# Patient Record
Sex: MALE | Race: WHITE | NOT HISPANIC OR LATINO | URBAN - METROPOLITAN AREA
[De-identification: names, ages, dates, MRNs, and addresses within clinical notes are randomized per-mention and may not be internally consistent; named-entity substitution may affect disease eponyms.]

---

## 2018-01-11 ENCOUNTER — LAB (OUTPATIENT)
Dept: LAB | Facility: HOSPITAL | Age: 28
End: 2018-01-11
Attending: INTERNAL MEDICINE

## 2018-01-11 DIAGNOSIS — R55 SYNCOPE AND COLLAPSE: Primary | ICD-10-CM

## 2018-01-11 DIAGNOSIS — R53.1 ASTHENIA: ICD-10-CM

## 2018-01-11 DIAGNOSIS — R42 DIZZINESS AND GIDDINESS: ICD-10-CM

## 2018-01-11 LAB
ANION GAP SERPL CALCULATED.3IONS-SCNC: 9 MMOL/L (ref 3–11)
BUN BLD-MCNC: 13 MG/DL (ref 9–23)
BUN/CREAT SERPL: 13 (ref 7–25)
CALCIUM SPEC-SCNC: 9.8 MG/DL (ref 8.7–10.4)
CHLORIDE SERPL-SCNC: 104 MMOL/L (ref 99–109)
CO2 SERPL-SCNC: 27 MMOL/L (ref 20–31)
CREAT BLD-MCNC: 1 MG/DL (ref 0.6–1.3)
DEPRECATED FTI SERPL-MCNC: 3.1 TBI
DEPRECATED RDW RBC AUTO: 42.4 FL (ref 37–54)
ERYTHROCYTE [DISTWIDTH] IN BLOOD BY AUTOMATED COUNT: 12.7 % (ref 11.3–14.5)
GFR SERPL CREATININE-BSD FRML MDRD: 90 ML/MIN/1.73
GLUCOSE BLD-MCNC: 79 MG/DL (ref 70–100)
HCT VFR BLD AUTO: 45.7 % (ref 38.9–50.9)
HGB BLD-MCNC: 15.8 G/DL (ref 13.1–17.5)
MCH RBC QN AUTO: 31.9 PG (ref 27–31)
MCHC RBC AUTO-ENTMCNC: 34.6 G/DL (ref 32–36)
MCV RBC AUTO: 92.3 FL (ref 80–99)
PLATELET # BLD AUTO: 218 10*3/MM3 (ref 150–450)
PMV BLD AUTO: 12.1 FL (ref 6–12)
POTASSIUM BLD-SCNC: 4.2 MMOL/L (ref 3.5–5.5)
RBC # BLD AUTO: 4.95 10*6/MM3 (ref 4.2–5.76)
SODIUM BLD-SCNC: 140 MMOL/L (ref 132–146)
T3RU NFR SERPL: 40.7 % (ref 23–37)
T4 SERPL-MCNC: 7.6 MCG/DL (ref 4.7–11.4)
T4 SERPL-MCNC: 7.6 MCG/DL (ref 4.7–11.4)
TSH SERPL DL<=0.05 MIU/L-ACNC: 0.77 MIU/ML (ref 0.35–5.35)
WBC NRBC COR # BLD: 7.56 10*3/MM3 (ref 3.5–10.8)

## 2018-01-11 PROCEDURE — 36415 COLL VENOUS BLD VENIPUNCTURE: CPT

## 2018-01-11 PROCEDURE — 85027 COMPLETE CBC AUTOMATED: CPT

## 2018-01-11 PROCEDURE — 84436 ASSAY OF TOTAL THYROXINE: CPT

## 2018-01-11 PROCEDURE — 84479 ASSAY OF THYROID (T3 OR T4): CPT

## 2018-01-11 PROCEDURE — 80048 BASIC METABOLIC PNL TOTAL CA: CPT

## 2018-01-11 PROCEDURE — 84443 ASSAY THYROID STIM HORMONE: CPT

## 2023-09-22 ENCOUNTER — LAB (OUTPATIENT)
Dept: INTERNAL MEDICINE | Facility: CLINIC | Age: 33
End: 2023-09-22
Payer: COMMERCIAL

## 2023-09-22 ENCOUNTER — OFFICE VISIT (OUTPATIENT)
Dept: INTERNAL MEDICINE | Facility: CLINIC | Age: 33
End: 2023-09-22

## 2023-09-22 VITALS
WEIGHT: 195.2 LBS | HEIGHT: 71 IN | OXYGEN SATURATION: 99 % | TEMPERATURE: 98 F | BODY MASS INDEX: 27.33 KG/M2 | RESPIRATION RATE: 18 BRPM | DIASTOLIC BLOOD PRESSURE: 86 MMHG | SYSTOLIC BLOOD PRESSURE: 130 MMHG | HEART RATE: 78 BPM

## 2023-09-22 DIAGNOSIS — F10.10 ALCOHOL ABUSE: ICD-10-CM

## 2023-09-22 DIAGNOSIS — F32.A ANXIETY AND DEPRESSION: ICD-10-CM

## 2023-09-22 DIAGNOSIS — F32.A ANXIETY AND DEPRESSION: Primary | ICD-10-CM

## 2023-09-22 DIAGNOSIS — F41.9 ANXIETY AND DEPRESSION: Primary | ICD-10-CM

## 2023-09-22 DIAGNOSIS — F41.9 ANXIETY AND DEPRESSION: ICD-10-CM

## 2023-09-22 LAB
25(OH)D3 SERPL-MCNC: 49.6 NG/ML (ref 30–100)
ALBUMIN SERPL-MCNC: 4.9 G/DL (ref 3.5–5.2)
ALBUMIN/GLOB SERPL: 2 G/DL
ALP SERPL-CCNC: 82 U/L (ref 39–117)
ALT SERPL W P-5'-P-CCNC: 14 U/L (ref 1–41)
ANION GAP SERPL CALCULATED.3IONS-SCNC: 13.3 MMOL/L (ref 5–15)
AST SERPL-CCNC: 20 U/L (ref 1–40)
BILIRUB SERPL-MCNC: 0.9 MG/DL (ref 0–1.2)
BUN SERPL-MCNC: 11 MG/DL (ref 6–20)
BUN/CREAT SERPL: 9.6 (ref 7–25)
CALCIUM SPEC-SCNC: 10 MG/DL (ref 8.6–10.5)
CHLORIDE SERPL-SCNC: 104 MMOL/L (ref 98–107)
CHOLEST SERPL-MCNC: 218 MG/DL (ref 0–200)
CO2 SERPL-SCNC: 24.7 MMOL/L (ref 22–29)
CREAT SERPL-MCNC: 1.14 MG/DL (ref 0.76–1.27)
DEPRECATED RDW RBC AUTO: 43.7 FL (ref 37–54)
EGFRCR SERPLBLD CKD-EPI 2021: 87.1 ML/MIN/1.73
ERYTHROCYTE [DISTWIDTH] IN BLOOD BY AUTOMATED COUNT: 12.8 % (ref 12.3–15.4)
FOLATE SERPL-MCNC: 10.3 NG/ML (ref 4.78–24.2)
GLOBULIN UR ELPH-MCNC: 2.4 GM/DL
GLUCOSE SERPL-MCNC: 86 MG/DL (ref 65–99)
HCT VFR BLD AUTO: 47.2 % (ref 37.5–51)
HDLC SERPL-MCNC: 52 MG/DL (ref 40–60)
HGB BLD-MCNC: 16.6 G/DL (ref 13–17.7)
LDLC SERPL CALC-MCNC: 148 MG/DL (ref 0–100)
LDLC/HDLC SERPL: 2.81 {RATIO}
MCH RBC QN AUTO: 33.1 PG (ref 26.6–33)
MCHC RBC AUTO-ENTMCNC: 35.2 G/DL (ref 31.5–35.7)
MCV RBC AUTO: 94 FL (ref 79–97)
PLATELET # BLD AUTO: 235 10*3/MM3 (ref 140–450)
PMV BLD AUTO: 11.5 FL (ref 6–12)
POTASSIUM SERPL-SCNC: 3.8 MMOL/L (ref 3.5–5.2)
PROT SERPL-MCNC: 7.3 G/DL (ref 6–8.5)
RBC # BLD AUTO: 5.02 10*6/MM3 (ref 4.14–5.8)
SODIUM SERPL-SCNC: 142 MMOL/L (ref 136–145)
TRIGL SERPL-MCNC: 99 MG/DL (ref 0–150)
TSH SERPL DL<=0.05 MIU/L-ACNC: 0.99 UIU/ML (ref 0.27–4.2)
VIT B12 BLD-MCNC: 515 PG/ML (ref 211–946)
VLDLC SERPL-MCNC: 18 MG/DL (ref 5–40)
WBC NRBC COR # BLD: 9.85 10*3/MM3 (ref 3.4–10.8)

## 2023-09-22 PROCEDURE — 82746 ASSAY OF FOLIC ACID SERUM: CPT | Performed by: NURSE PRACTITIONER

## 2023-09-22 PROCEDURE — 80050 GENERAL HEALTH PANEL: CPT | Performed by: NURSE PRACTITIONER

## 2023-09-22 PROCEDURE — 80061 LIPID PANEL: CPT | Performed by: NURSE PRACTITIONER

## 2023-09-22 PROCEDURE — 83036 HEMOGLOBIN GLYCOSYLATED A1C: CPT | Performed by: NURSE PRACTITIONER

## 2023-09-22 PROCEDURE — 82306 VITAMIN D 25 HYDROXY: CPT | Performed by: NURSE PRACTITIONER

## 2023-09-22 PROCEDURE — 84425 ASSAY OF VITAMIN B-1: CPT | Performed by: NURSE PRACTITIONER

## 2023-09-22 PROCEDURE — 82607 VITAMIN B-12: CPT | Performed by: NURSE PRACTITIONER

## 2023-09-22 PROCEDURE — 36415 COLL VENOUS BLD VENIPUNCTURE: CPT | Performed by: NURSE PRACTITIONER

## 2023-09-22 RX ORDER — ESCITALOPRAM OXALATE 10 MG/1
10 TABLET ORAL DAILY
Qty: 30 TABLET | Refills: 1 | Status: SHIPPED | OUTPATIENT
Start: 2023-09-22

## 2023-09-22 RX ORDER — PREDNISONE 20 MG/1
20 TABLET ORAL DAILY
COMMUNITY
Start: 2023-09-20

## 2023-09-22 NOTE — PROGRESS NOTES
Subjective   Riaz Dumont is a 33 y.o. male here to establish care.  Chief Complaint   Patient presents with    Establish Care    Anxiety     Pt has been having a hard time lately with his anxiety, has taken paxil in the past but did not feel it worked well after some time     Depression    Alcohol Problem     Pt would like assistance with cutting back on his drinking       History of Present Illness     He is here to discuss anxiety, depression, and alcohol use.    The patient states he had an episode of anxiety and depression. He is not familiar with Lexapro. He notes he is familiar with Wellbutrin and Zoloft; however, he has not been of the medication before. He feels his depression has caused intercourse issues. He denies any other concerns.     He reports he has been over drinking. He adds drinking helps him relax. 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 He states he is not on medication; however, he was on Paxil approximately 3 years ago, but he quit because he felt like he did not need to take it anymore. He adds he was on Paxil for approximately 1.5 years which was somewhat helpful. He notes he might have been on 15 mg of Paxil, but only took 0.5 of the dose. He denies having any suicidal thoughts. The patient states he drinks approximately 4 to 5 beers a day. He denies having withdraw symptoms when he does not drink. He occasionally feel like he needs a drink first thing in the morning, especially when he gets off a shift to fall asleep quicker. The patient states he gets annoyed when someone talks about how much he drinks. He feels guilty about how much he drinks. He fells he should cut down on drinking and would like to go back to drinking occasionally instead of just wanted to drink.       CAGE Questionnaire:  Have you ever felt you should Cut down on your drinking? y  Have people Annoyed you by criticizing your drinking? y  Have you ever felt bad or Guilty about your drinking? y  Have  you ever had a drink first thing in the morning to steady your nerves or to get rid of a hangover (Eye opener)? y  SCORE 4/4    The following portions of the patient's history were reviewed and updated as appropriate: allergies, current medications, past family history, past medical history, past social history, past surgical history and problem list.    Review of Systems   Constitutional:  Negative for fatigue, fever and unexpected weight loss.   Eyes:  Negative for blurred vision, double vision and visual disturbance.   Respiratory:  Negative for cough, shortness of breath and wheezing.    Cardiovascular:  Negative for chest pain, palpitations and leg swelling.   Gastrointestinal:  Negative for abdominal pain, constipation, diarrhea, nausea and vomiting.   Genitourinary:  Negative for difficulty urinating, frequency and urgency.   Musculoskeletal:  Negative for arthralgias and myalgias.   Skin:  Negative for color change and rash.   Neurological:  Negative for dizziness, weakness and headache.   Hematological:  Negative for adenopathy. Does not bruise/bleed easily.     PHQ-9 Depression Screening  Little interest or pleasure in doing things? 2-->more than half the days   Feeling down, depressed, or hopeless? 3-->nearly every day   Trouble falling or staying asleep, or sleeping too much? 3-->nearly every day   Feeling tired or having little energy? 3-->nearly every day   Poor appetite or overeating? 2-->more than half the days   Feeling bad about yourself - or that you are a failure or have let yourself or your family down? 3-->nearly every day   Trouble concentrating on things, such as reading the newspaper or watching television? 3-->nearly every day   Moving or speaking so slowly that other people could have noticed? Or the opposite - being so fidgety or restless that you have been moving around a lot more than usual? 2-->more than half the days   Thoughts that you would be better off dead, or of hurting yourself  in some way? 0-->not at all   PHQ-9 Total Score 21   If you checked off any problems, how difficult have these problems made it for you to do your work, take care of things at home, or get along with other people? not difficult at all      Anxiety MADAY-7    Feeling nervous, anxious or on edge: Nearly every day  Not being able to stop or control worrying: Nearly every day  Worrying too much about different things: Nearly every day  Trouble Relaxing: Nearly every day  Being so restless that it is hard to sit still: More than half the days  Becoming easily annoyed or irritable: Nearly every day  Feeling afraid as if something awful might happen: Nearly every day  MADAY 7 Total Score: 20  If you checked any problems, how difficult have these problems made it for you to do your work, take care of things at home, or get along with other people: Somewhat difficult          No Known Allergies  Past Medical History:   Diagnosis Date    Anxiety 2021    Had med for anxiety/depression before. Need try soemthing different    Depression 2021    Same as anxiety     Past Surgical History:   Procedure Laterality Date    EYE SURGERY  2018    Had lasik surgery    VASECTOMY  2018     Family History   Problem Relation Age of Onset    Thyroid disease Mother     Cancer Father         Had skin cancer    Diabetes Father      Social History     Socioeconomic History    Marital status:    Tobacco Use    Smoking status: Some Days     Types: Electronic Cigarette    Smokeless tobacco: Current   Vaping Use    Vaping Use: Every day    Substances: Nicotine, Flavoring    Devices: Disposable   Substance and Sexual Activity    Alcohol use: Yes     Alcohol/week: 4.0 standard drinks     Types: 4 Cans of beer per week     Comment: 4-5 days week    Drug use: Never    Sexual activity: Yes     Partners: Female     Birth control/protection: Vasectomy     Immunization History   Administered Date(s) Administered    COVID-19 (MODERNA) Monovalent Original  "Booster 01/12/2022       Current Outpatient Medications:     predniSONE (DELTASONE) 20 MG tablet, Take 1 tablet by mouth Daily., Disp: , Rfl:     escitalopram (Lexapro) 10 MG tablet, Take 1 tablet by mouth Daily., Disp: 30 tablet, Rfl: 1    Objective   Blood pressure 130/86, pulse 78, temperature 98 °F (36.7 °C), temperature source Infrared, resp. rate 18, height 180.3 cm (71\"), weight 88.5 kg (195 lb 3.2 oz), SpO2 99 %.  Physical Exam  Vitals and nursing note reviewed.   Constitutional:       Appearance: Normal appearance. He is well-developed.   HENT:      Head: Normocephalic and atraumatic.   Eyes:      Conjunctiva/sclera: Conjunctivae normal.   Cardiovascular:      Rate and Rhythm: Normal rate and regular rhythm.      Heart sounds: Normal heart sounds.   Pulmonary:      Effort: Pulmonary effort is normal. No respiratory distress.      Breath sounds: Normal breath sounds.   Abdominal:      General: Bowel sounds are normal. There is no distension.      Palpations: Abdomen is soft.      Tenderness: There is no abdominal tenderness.   Musculoskeletal:      Cervical back: Normal range of motion.   Skin:     General: Skin is warm and dry.   Neurological:      Mental Status: He is alert and oriented to person, place, and time.   Psychiatric:         Speech: Speech normal.         Behavior: Behavior normal.         Thought Content: Thought content normal.         Judgment: Judgment normal.       Assessment & Plan   Diagnoses and all orders for this visit:    1. Anxiety and depression (Primary)  -     escitalopram (Lexapro) 10 MG tablet; Take 1 tablet by mouth Daily.  Dispense: 30 tablet; Refill: 1  -     CBC (No Diff); Future  -     Comprehensive Metabolic Panel; Future  -     Lipid Panel; Future  -     Hemoglobin A1c; Future  -     Vitamin B12; Future  -     Vitamin D,25-Hydroxy; Future  -     TSH Rfx On Abnormal To Free T4; Future    2. Alcohol abuse  -     Ambulatory Referral to Chemical Dependency  -     CBC (No " Diff); Future  -     Comprehensive Metabolic Panel; Future  -     Lipid Panel; Future  -     Hemoglobin A1c; Future  -     Vitamin B12; Future  -     Vitamin D,25-Hydroxy; Future  -     TSH Rfx On Abnormal To Free T4; Future  -     Vitamin B1, Whole Blood; Future  -     Vitamin B12; Future  -     Folate; Future    Anxiety and depression  - Will start on Lexapro 10 mg and check labs. Adverse effects and expectations discussed. Follow up in 4 weeks for reevaluation.    Alcohol abuse  - Refer to chemical dependency program. Encouraged cessation/continued abstinence.         Return in about 4 weeks (around 10/20/2023) for Recheck, and need to collect labs today.  Plan of care discussed with pt. They verbalized understanding and agreement.     Transcribed from ambient dictation for ALEXANDER Amos by Shun Arevalo.  09/22/23   13:14 EDT    Patient or patient representative verbalized consent to the visit recording.  I have personally performed the services described in this document as transcribed by the above individual, and it is both accurate and complete.  ALEXANDER Amos  9/25/2023  12:41 EDT

## 2023-09-23 LAB — HBA1C MFR BLD: 4.7 % (ref 4.8–5.6)

## 2023-09-29 LAB — VIT B1 BLD-SCNC: 154.3 NMOL/L (ref 66.5–200)

## 2023-10-25 ENCOUNTER — OFFICE VISIT (OUTPATIENT)
Dept: INTERNAL MEDICINE | Facility: CLINIC | Age: 33
End: 2023-10-25
Payer: COMMERCIAL

## 2023-10-25 VITALS
SYSTOLIC BLOOD PRESSURE: 108 MMHG | HEIGHT: 71 IN | TEMPERATURE: 97.8 F | HEART RATE: 72 BPM | WEIGHT: 198.6 LBS | BODY MASS INDEX: 27.8 KG/M2 | RESPIRATION RATE: 18 BRPM | OXYGEN SATURATION: 98 % | DIASTOLIC BLOOD PRESSURE: 76 MMHG

## 2023-10-25 DIAGNOSIS — F41.9 ANXIETY AND DEPRESSION: ICD-10-CM

## 2023-10-25 DIAGNOSIS — F32.A ANXIETY AND DEPRESSION: ICD-10-CM

## 2023-10-25 PROCEDURE — 99213 OFFICE O/P EST LOW 20 MIN: CPT | Performed by: NURSE PRACTITIONER

## 2023-10-25 RX ORDER — ESCITALOPRAM OXALATE 20 MG/1
20 TABLET ORAL DAILY
Qty: 30 TABLET | Refills: 3 | Status: SHIPPED | OUTPATIENT
Start: 2023-10-25

## 2023-10-25 RX ORDER — ESCITALOPRAM OXALATE 20 MG/1
20 TABLET ORAL DAILY
Start: 2023-10-25 | End: 2023-10-25 | Stop reason: SDUPTHER

## 2023-10-25 NOTE — PATIENT INSTRUCTIONS
Calorie Counting for Weight Loss  Calories are units of energy. Your body needs a certain number of calories from food to keep going throughout the day. When you eat or drink more calories than your body needs, your body stores the extra calories mostly as fat. When you eat or drink fewer calories than your body needs, your body burns fat to get the energy it needs.  Calorie counting means keeping track of how many calories you eat and drink each day. Calorie counting can be helpful if you need to lose weight. If you eat fewer calories than your body needs, you should lose weight. Ask your health care provider what a healthy weight is for you.  For calorie counting to work, you will need to eat the right number of calories each day to lose a healthy amount of weight per week. A dietitian can help you figure out how many calories you need in a day and will suggest ways to reach your calorie goal.  A healthy amount of weight to lose each week is usually 1-2 lb (0.5-0.9 kg). This usually means that your daily calorie intake should be reduced by 500-750 calories.  Eating 1,200-1,500 calories a day can help most women lose weight.  Eating 1,500-1,800 calories a day can help most men lose weight.  What do I need to know about calorie counting?  Work with your health care provider or dietitian to determine how many calories you should get each day. To meet your daily calorie goal, you will need to:  Find out how many calories are in each food that you would like to eat. Try to do this before you eat.  Decide how much of the food you plan to eat.  Keep a food log. Do this by writing down what you ate and how many calories it had.  To successfully lose weight, it is important to balance calorie counting with a healthy lifestyle that includes regular activity.  Where do I find calorie information?    The number of calories in a food can be found on a Nutrition Facts label. If a food does not have a Nutrition Facts label, try  to look up the calories online or ask your dietitian for help.  Remember that calories are listed per serving. If you choose to have more than one serving of a food, you will have to multiply the calories per serving by the number of servings you plan to eat. For example, the label on a package of bread might say that a serving size is 1 slice and that there are 90 calories in a serving. If you eat 1 slice, you will have eaten 90 calories. If you eat 2 slices, you will have eaten 180 calories.  How do I keep a food log?  After each time that you eat, record the following in your food log as soon as possible:  What you ate. Be sure to include toppings, sauces, and other extras on the food.  How much you ate. This can be measured in cups, ounces, or number of items.  How many calories were in each food and drink.  The total number of calories in the food you ate.  Keep your food log near you, such as in a pocket-sized notebook or on an jaja or website on your mobile phone. Some programs will calculate calories for you and show you how many calories you have left to meet your daily goal.  What are some portion-control tips?  Know how many calories are in a serving. This will help you know how many servings you can have of a certain food.  Use a measuring cup to measure serving sizes. You could also try weighing out portions on a kitchen scale. With time, you will be able to estimate serving sizes for some foods.  Take time to put servings of different foods on your favorite plates or in your favorite bowls and cups so you know what a serving looks like.  Try not to eat straight from a food's packaging, such as from a bag or box. Eating straight from the package makes it hard to see how much you are eating and can lead to overeating. Put the amount you would like to eat in a cup or on a plate to make sure you are eating the right portion.  Use smaller plates, glasses, and bowls for smaller portions and to prevent  overeating.  Try not to multitask. For example, avoid watching TV or using your computer while eating. If it is time to eat, sit down at a table and enjoy your food. This will help you recognize when you are full. It will also help you be more mindful of what and how much you are eating.  What are tips for following this plan?  Reading food labels  Check the calorie count compared with the serving size. The serving size may be smaller than what you are used to eating.  Check the source of the calories. Try to choose foods that are high in protein, fiber, and vitamins, and low in saturated fat, trans fat, and sodium.  Shopping  Read nutrition labels while you shop. This will help you make healthy decisions about which foods to buy.  Pay attention to nutrition labels for low-fat or fat-free foods. These foods sometimes have the same number of calories or more calories than the full-fat versions. They also often have added sugar, starch, or salt to make up for flavor that was removed with the fat.  Make a grocery list of lower-calorie foods and stick to it.  Cooking  Try to cook your favorite foods in a healthier way. For example, try baking instead of frying.  Use low-fat dairy products.  Meal planning  Use more fruits and vegetables. One-half of your plate should be fruits and vegetables.  Include lean proteins, such as chicken, turkey, and fish.  Lifestyle  Each week, aim to do one of the followin minutes of moderate exercise, such as walking.  75 minutes of vigorous exercise, such as running.  General information  Know how many calories are in the foods you eat most often. This will help you calculate calorie counts faster.  Find a way of tracking calories that works for you. Get creative. Try different apps or programs if writing down calories does not work for you.  What foods should I eat?    Eat nutritious foods. It is better to have a nutritious, high-calorie food, such as an avocado, than a food with  few nutrients, such as a bag of potato chips.  Use your calories on foods and drinks that will fill you up and will not leave you hungry soon after eating.  Examples of foods that fill you up are nuts and nut butters, vegetables, lean proteins, and high-fiber foods such as whole grains. High-fiber foods are foods with more than 5 g of fiber per serving.  Pay attention to calories in drinks. Low-calorie drinks include water and unsweetened drinks.  The items listed above may not be a complete list of foods and beverages you can eat. Contact a dietitian for more information.  What foods should I limit?  Limit foods or drinks that are not good sources of vitamins, minerals, or protein or that are high in unhealthy fats. These include:  Candy.  Other sweets.  Sodas, specialty coffee drinks, alcohol, and juice.  The items listed above may not be a complete list of foods and beverages you should avoid. Contact a dietitian for more information.  How do I count calories when eating out?  Pay attention to portions. Often, portions are much larger when eating out. Try these tips to keep portions smaller:  Consider sharing a meal instead of getting your own.  If you get your own meal, eat only half of it. Before you start eating, ask for a container and put half of your meal into it.  When available, consider ordering smaller portions from the menu instead of full portions.  Pay attention to your food and drink choices. Knowing the way food is cooked and what is included with the meal can help you eat fewer calories.  If calories are listed on the menu, choose the lower-calorie options.  Choose dishes that include vegetables, fruits, whole grains, low-fat dairy products, and lean proteins.  Choose items that are boiled, broiled, grilled, or steamed. Avoid items that are buttered, battered, fried, or served with cream sauce. Items labeled as crispy are usually fried, unless stated otherwise.  Choose water, low-fat milk,  unsweetened iced tea, or other drinks without added sugar. If you want an alcoholic beverage, choose a lower-calorie option, such as a glass of wine or light beer.  Ask for dressings, sauces, and syrups on the side. These are usually high in calories, so you should limit the amount you eat.  If you want a salad, choose a garden salad and ask for grilled meats. Avoid extra toppings such as espinal, cheese, or fried items. Ask for the dressing on the side, or ask for olive oil and vinegar or lemon to use as dressing.  Estimate how many servings of a food you are given. Knowing serving sizes will help you be aware of how much food you are eating at restaurants.  Where to find more information  Centers for Disease Control and Prevention: www.cdc.gov  [place] Department of Agriculture: myplate.gov  Summary  Calorie counting means keeping track of how many calories you eat and drink each day. If you eat fewer calories than your body needs, you should lose weight.  A healthy amount of weight to lose per week is usually 1-2 lb (0.5-0.9 kg). This usually means reducing your daily calorie intake by 500-750 calories.  The number of calories in a food can be found on a Nutrition Facts label. If a food does not have a Nutrition Facts label, try to look up the calories online or ask your dietitian for help.  Use smaller plates, glasses, and bowls for smaller portions and to prevent overeating.  Use your calories on foods and drinks that will fill you up and not leave you hungry shortly after a meal.  This information is not intended to replace advice given to you by your health care provider. Make sure you discuss any questions you have with your health care provider.  Document Revised: 01/28/2021 Document Reviewed: 01/28/2021  Elsevier Patient Education © 2023 [employer]  Exercising to Lose Weight  Getting regular exercise is important for everyone. It is especially important if you are overweight. Being overweight increases your  risk of heart disease, stroke, diabetes, high blood pressure, and several types of cancer. Exercising, and reducing the calories you consume, can help you lose weight and improve fitness and health.  Exercise can be moderate or vigorous intensity. To lose weight, most people need to do a certain amount of moderate or vigorous-intensity exercise each week.  How can exercise affect me?  You lose weight when you exercise enough to burn more calories than you eat. Exercise also reduces body fat and builds muscle. The more muscle you have, the more calories you burn. Exercise also:  Improves mood.  Reduces stress and tension.  Improves your overall fitness, flexibility, and endurance.  Increases bone strength.  Moderate-intensity exercise    Moderate-intensity exercise is any activity that gets you moving enough to burn at least three times more energy (calories) than if you were sitting.  Examples of moderate exercise include:  Walking a mile in 15 minutes.  Doing light yard work.  Biking at an easy pace.  Most people should get at least 150 minutes of moderate-intensity exercise a week to maintain their body weight.  Vigorous-intensity exercise  Vigorous-intensity exercise is any activity that gets you moving enough to burn at least six times more calories than if you were sitting. When you exercise at this intensity, you should be working hard enough that you are not able to carry on a conversation.  Examples of vigorous exercise include:  Running.  Playing a team sport, such as football, basketball, and soccer.  Jumping rope.  Most people should get at least 75 minutes a week of vigorous exercise to maintain their body weight.  What actions can I take to lose weight?  The amount of exercise you need to lose weight depends on:  Your age.  The type of exercise.  Any health conditions you have.  Your overall physical ability.  Talk to your health care provider about how much exercise you need and what types of  activities are safe for you.  Nutrition    Make changes to your diet as told by your health care provider or diet and nutrition specialist (dietitian). This may include:  Eating fewer calories.  Eating more protein.  Eating less unhealthy fats.  Eating a diet that includes fresh fruits and vegetables, whole grains, low-fat dairy products, and lean protein.  Avoiding foods with added fat, salt, and sugar.  Drink plenty of water while you exercise to prevent dehydration or heat stroke.  Activity  Choose an activity that you enjoy and set realistic goals. Your health care provider can help you make an exercise plan that works for you.  Exercise at a moderate or vigorous intensity most days of the week.  The intensity of exercise may vary from person to person. You can tell how intense a workout is for you by paying attention to your breathing and heartbeat. Most people will notice their breathing and heartbeat get faster with more intense exercise.  Do resistance training twice each week, such as:  Push-ups.  Sit-ups.  Lifting weights.  Using resistance bands.  Getting short amounts of exercise can be just as helpful as long, structured periods of exercise. If you have trouble finding time to exercise, try doing these things as part of your daily routine:  Get up, stretch, and walk around every 30 minutes throughout the day.  Go for a walk during your lunch break.  Park your car farther away from your destination.  If you take public transportation, get off one stop early and walk the rest of the way.  Make phone calls while standing up and walking around.  Take the stairs instead of elevators or escalators.  Wear comfortable clothes and shoes with good support.  Do not exercise so much that you hurt yourself, feel dizzy, or get very short of breath.  Where to find more information  [place] Department of Health and Human Services: www.hhs.gov  Centers for Disease Control and Prevention: www.cdc.gov  Contact a health care  provider:  Before starting a new exercise program.  If you have questions or concerns about your weight.  If you have a medical problem that keeps you from exercising.  Get help right away if:  You have any of the following while exercising:  Injury.  Dizziness.  Difficulty breathing or shortness of breath that does not go away when you stop exercising.  Chest pain.  Rapid heartbeat.  These symptoms may represent a serious problem that is an emergency. Do not wait to see if the symptoms will go away. Get medical help right away. Call your local emergency services (911 in the [place] Do not drive yourself to the hospital.  Summary  Getting regular exercise is especially important if you are overweight.  Being overweight increases your risk of heart disease, stroke, diabetes, high blood pressure, and several types of cancer.  Losing weight happens when you burn more calories than you eat.  Reducing the amount of calories you eat, and getting regular moderate or vigorous exercise each week, helps you lose weight.  This information is not intended to replace advice given to you by your health care provider. Make sure you discuss any questions you have with your health care provider.  Document Revised: 02/13/2022 Document Reviewed: 02/13/2022  Elsevier Patient Education © 2023 [employer]

## 2023-10-25 NOTE — PROGRESS NOTES
Subjective   Riaz Dumont is a 33 y.o. male.     Chief Complaint   Patient presents with    Anxiety     4 week recheck     Depression     4 week recheck        PCP: Lashon Sy APRN    History of Present Illness     The patient presents to the office today for follow-up on anxiety and depression. At his last visit, he was started on Lexapro.     The patient reports he was ill for the first week after starting the Lexapro with increased fatigue and headaches. He notes he did not take the medication last night, 10/24/2023, as he had a migraine. He states he switched to taking the medication at night as he was falling asleep at work.     Currently, the patient reports he is continuing to experience depression more than anxiety. He denies any suicidal ideation. He adds that he is sleeping okay for the most part. The patient is interested in increasing his dosage, but he is concerned as he is sensitive to the medication.    The patient is not interested in updating any vaccines today. He states he undergoes annual examinations at work, and this was performed recently with normal results.     He denies any new medical concerns today.    The following portions of the patient's history were reviewed and updated as appropriate: allergies, current medications, past family history, past medical history, past social history, past surgical history and problem list.        Review of Systems   Constitutional:  Negative for fatigue, fever and unexpected weight loss.   Eyes:  Negative for blurred vision, double vision and visual disturbance.   Respiratory:  Negative for cough, shortness of breath and wheezing.    Cardiovascular:  Negative for chest pain, palpitations and leg swelling.   Gastrointestinal:  Negative for abdominal pain, constipation, diarrhea, nausea and vomiting.   Genitourinary:  Negative for difficulty urinating, frequency and urgency.   Musculoskeletal:  Negative for arthralgias and myalgias.   Skin:   Negative for color change and rash.   Neurological:  Negative for dizziness, weakness and headache.   Hematological:  Negative for adenopathy. Does not bruise/bleed easily.   Psychiatric/Behavioral:  Positive for depressed mood. The patient is nervous/anxious.            Outpatient Medications Marked as Taking for the 10/25/23 encounter (Office Visit) with Lashon Sy APRN   Medication Sig Dispense Refill    escitalopram (Lexapro) 20 MG tablet Take 1 tablet by mouth Daily. 30 tablet 3    [DISCONTINUED] escitalopram (Lexapro) 20 MG tablet Take 1 tablet by mouth Daily.       No Known Allergies        Objective   Physical Exam  Vitals and nursing note reviewed.   Constitutional:       Appearance: Normal appearance. He is well-developed.   HENT:      Head: Normocephalic and atraumatic.   Eyes:      Conjunctiva/sclera: Conjunctivae normal.   Cardiovascular:      Rate and Rhythm: Normal rate and regular rhythm.      Heart sounds: Normal heart sounds.   Pulmonary:      Effort: Pulmonary effort is normal. No respiratory distress.      Breath sounds: Normal breath sounds.   Abdominal:      General: Bowel sounds are normal. There is no distension.      Palpations: Abdomen is soft.      Tenderness: There is no abdominal tenderness.   Musculoskeletal:      Cervical back: Normal range of motion.   Skin:     General: Skin is warm and dry.   Neurological:      Mental Status: He is alert and oriented to person, place, and time.   Psychiatric:         Speech: Speech normal.         Behavior: Behavior normal.         Thought Content: Thought content normal.         Judgment: Judgment normal.       Anxiety MADAY-7    Feeling nervous, anxious or on edge: Not at all  Not being able to stop or control worrying: More than half the days  Worrying too much about different things: More than half the days  Trouble Relaxing: Several days  Being so restless that it is hard to sit still: Several days  Becoming easily annoyed or irritable:  "Not at all  Feeling afraid as if something awful might happen: Several days  MADAY 7 Total Score: 7  If you checked any problems, how difficult have these problems made it for you to do your work, take care of things at home, or get along with other people: Somewhat difficult     PHQ-9 Depression Screening  Little interest or pleasure in doing things? 0-->not at all   Feeling down, depressed, or hopeless? 1-->several days   Trouble falling or staying asleep, or sleeping too much?     Feeling tired or having little energy?     Poor appetite or overeating?     Feeling bad about yourself - or that you are a failure or have let yourself or your family down?     Trouble concentrating on things, such as reading the newspaper or watching television?     Moving or speaking so slowly that other people could have noticed? Or the opposite - being so fidgety or restless that you have been moving around a lot more than usual?     Thoughts that you would be better off dead, or of hurting yourself in some way?     PHQ-9 Total Score 1   If you checked off any problems, how difficult have these problems made it for you to do your work, take care of things at home, or get along with other people?         Vitals:    10/25/23 0810   BP: 108/76   BP Location: Right arm   Patient Position: Sitting   Cuff Size: Adult   Pulse: 72   Resp: 18   Temp: 97.8 °F (36.6 °C)   TempSrc: Infrared   SpO2: 98%   Weight: 90.1 kg (198 lb 9.6 oz)   Height: 180.3 cm (71\")     Body mass index is 27.7 kg/m².  Wt Readings from Last 3 Encounters:   10/25/23 90.1 kg (198 lb 9.6 oz)   09/22/23 88.5 kg (195 lb 3.2 oz)             Assessment & Plan   Diagnoses and all orders for this visit:    1. Anxiety and depression  -     Discontinue: escitalopram (Lexapro) 20 MG tablet; Take 1 tablet by mouth Daily.  -     escitalopram (Lexapro) 20 MG tablet; Take 1 tablet by mouth Daily.  Dispense: 30 tablet; Refill: 3      1. Anxiety and depression  - Still slightly under " controlled, but improving with Lexapro. Will increase the dose to 20 mg. He may do this by increasing to 15 mg for 1 or 2 weeks then increasing to 20 mg. He has 10 mg tablets that he can make this dose with. He will inform me if he has any issues. He will follow up in 3 months for annual examination and re-evaluation, sooner if any symptoms worsen or any new concerns arise.     2. Preventative care  - The patient declines any vaccines today.     BMI is >= 25 and <30. (Overweight) The following options were offered after discussion;: weight loss educational material (shared in after visit summary)      Return in about 3 months (around 1/25/2024) for Annual.    I discussed my findings,recommendations, and plan of care was with the patient. They verbalized understanding and agreement.  Patient was encouraged to keep me informed of any acute changes, lack of improvement, or any new concerning symptoms.     Transcribed from ambient dictation for ALEXANDER Amos by Kim Bernabe.  10/25/23   09:53 EDT    Patient or patient representative verbalized consent to the visit recording.  I have personally performed the services described in this document as transcribed by the above individual, and it is both accurate and complete.  ALEXANDER Amos  10/25/2023  11:47 EDT

## 2023-12-14 ENCOUNTER — PATIENT MESSAGE (OUTPATIENT)
Dept: INTERNAL MEDICINE | Facility: CLINIC | Age: 33
End: 2023-12-14
Payer: COMMERCIAL

## 2023-12-14 DIAGNOSIS — F41.9 ANXIETY AND DEPRESSION: Primary | ICD-10-CM

## 2023-12-14 DIAGNOSIS — F32.A ANXIETY AND DEPRESSION: Primary | ICD-10-CM

## 2023-12-19 RX ORDER — BUPROPION HYDROCHLORIDE 150 MG/1
150 TABLET ORAL DAILY
Qty: 30 TABLET | Refills: 2 | Status: SHIPPED | OUTPATIENT
Start: 2023-12-19

## 2024-02-07 ENCOUNTER — OFFICE VISIT (OUTPATIENT)
Dept: INTERNAL MEDICINE | Facility: CLINIC | Age: 34
End: 2024-02-07
Payer: COMMERCIAL

## 2024-02-07 VITALS
BODY MASS INDEX: 28.67 KG/M2 | HEIGHT: 71 IN | SYSTOLIC BLOOD PRESSURE: 110 MMHG | TEMPERATURE: 97.5 F | DIASTOLIC BLOOD PRESSURE: 76 MMHG | HEART RATE: 76 BPM | OXYGEN SATURATION: 99 % | WEIGHT: 204.8 LBS | RESPIRATION RATE: 18 BRPM

## 2024-02-07 DIAGNOSIS — F32.A ANXIETY AND DEPRESSION: ICD-10-CM

## 2024-02-07 DIAGNOSIS — Z23 NEED FOR TDAP VACCINATION: ICD-10-CM

## 2024-02-07 DIAGNOSIS — Z23 FLU VACCINE NEED: ICD-10-CM

## 2024-02-07 DIAGNOSIS — Z23 NEED FOR PNEUMOCOCCAL VACCINE: ICD-10-CM

## 2024-02-07 DIAGNOSIS — Z00.00 ANNUAL PHYSICAL EXAM: Primary | ICD-10-CM

## 2024-02-07 DIAGNOSIS — F41.9 ANXIETY AND DEPRESSION: ICD-10-CM

## 2024-02-07 DIAGNOSIS — F10.10 ALCOHOL ABUSE: ICD-10-CM

## 2024-02-07 RX ORDER — BUPROPION HYDROCHLORIDE 300 MG/1
300 TABLET ORAL DAILY
Qty: 30 TABLET | Refills: 3 | Status: SHIPPED | OUTPATIENT
Start: 2024-02-07

## 2024-02-07 NOTE — PROGRESS NOTES
Patient Care Team:  Lashon Sy APRN as PCP - General (Nurse Practitioner)     Chief Complaint   Patient presents with    Annual Exam           Patient is a 33 y.o. male who presents for his yearly physical exam.     HPI   The patient is a 33-year-old male who is here for annual exam. He has anxiety and depression and is currently on bupropion  mg daily. No longer taking Lexapro.    He reports he is no longer taking Lexapro due too feeling too sleepy, was still very anxious, and had bad depression. He had suicidal thoughts. He states he was drinking significantly. He notes he contacted a  friend in [place] who talked to him and asked him to go to the hospital. He went to hospital and asked if he could take Wellbutrin, which worked well for him. He no longer has suicidal thoughts since being on Wellbutrin. He denies having any suicidal attempts. He states Wellbutrin has been amazing for him. He reports having more energy and less anxiety. He still has anxious symptoms, but he is not sure if it is related to his job. He adds he would like to try a higher dose of Wellbutrin. He used to feel nauseous with Lexapro, but not with Wellbutrin. He barely eats. He goes to the gym a lot.     He inquires if there is medication to help with premature ejaculation.    He went to a house in [place] for 1 month for alcohol abuse. He was taking naltrexone for 2 to 3 weeks, but it made him want to keep drinking more. He has been doing better off of it. He has not tried AA meetings. He does not trust a lot of people about opening up to what is going on with his life. He did go see treatment after the suicide.    He is still vaping. He has stopped doing the patches.      His mother has a thyroid problem.    He received him influenza 2 weeks ago. He had COVID-19 infection. He has had all of his COVID-19 vaccines except the latest booster.    Health maintenance/lifestyle:  Health Maintenance   Topic Date Due     COVID-19 Vaccine (2 - 2023-24 season) 09/01/2023    ANNUAL PHYSICAL  Never done    BMI FOLLOWUP  10/25/2024    TDAP/TD VACCINES (2 - Td or Tdap) 02/07/2034    HEPATITIS C SCREENING  Completed    Pneumococcal Vaccine 0-64  Completed    INFLUENZA VACCINE  Completed     Immunization History   Administered Date(s) Administered    COVID-19 (MODERNA) Monovalent Original Booster 01/12/2022    Fluzone (or Fluarix & Flulaval for VFC) >6mos 02/07/2024    Pneumococcal Conjugate 20-Valent (PCV20) 02/07/2024    Tdap 02/07/2024     Cancer-related family history includes Cancer in his father.   reports being sexually active and has had partner(s) who are female. He reports using the following method of birth control/protection: Vasectomy.  Social History     Tobacco Use   Smoking Status Some Days    Types: Electronic Cigarette   Smokeless Tobacco Current     Social History     Substance and Sexual Activity   Alcohol Use Yes    Alcohol/week: 4.0 standard drinks of alcohol    Types: 4 Cans of beer per week    Comment: 4-5 days week     Tetanus: Up to date;  02/07/2024  Hep C screening: Normal      PHQ-2 Depression Screening  Little interest or pleasure in doing things? 0-->not at all   Feeling down, depressed, or hopeless? 1-->several days   PHQ-2 Total Score 1         Review of Systems   Constitutional:  Negative for fatigue, fever and unexpected weight loss.   Eyes:  Negative for blurred vision, double vision and visual disturbance.   Respiratory:  Negative for cough, shortness of breath and wheezing.    Cardiovascular:  Negative for chest pain, palpitations and leg swelling.   Gastrointestinal:  Negative for abdominal pain, constipation, diarrhea, nausea and vomiting.   Genitourinary:  Negative for difficulty urinating, frequency and urgency.   Musculoskeletal:  Negative for arthralgias and myalgias.   Skin:  Negative for color change and rash.   Neurological:  Negative for dizziness, weakness and headache.   Hematological:   "Negative for adenopathy. Does not bruise/bleed easily.   Psychiatric/Behavioral:  Positive for depressed mood. The patient is nervous/anxious.          History  Social History     Socioeconomic History    Marital status:    Tobacco Use    Smoking status: Some Days     Types: Electronic Cigarette    Smokeless tobacco: Current   Vaping Use    Vaping Use: Every day    Substances: Nicotine, Flavoring    Devices: Disposable   Substance and Sexual Activity    Alcohol use: Yes     Alcohol/week: 4.0 standard drinks of alcohol     Types: 4 Cans of beer per week     Comment: 4-5 days week    Drug use: Never    Sexual activity: Yes     Partners: Female     Birth control/protection: Vasectomy     Past Medical History:   Diagnosis Date    Anxiety 2021    Had med for anxiety/depression before. Need try soemthing different    Depression 2021    Same as anxiety      Past Surgical History:   Procedure Laterality Date    EYE SURGERY  2018    Had lasik surgery    VASECTOMY  2018      No Known Allergies   Family History   Problem Relation Age of Onset    Thyroid disease Mother     Cancer Father         Had skin cancer    Diabetes Father        Current Outpatient Medications:     buPROPion XL (Wellbutrin XL) 300 MG 24 hr tablet, Take 1 tablet by mouth Daily., Disp: 30 tablet, Rfl: 3                  /76 (BP Location: Right arm, Patient Position: Sitting, Cuff Size: Adult)   Pulse 76   Temp 97.5 °F (36.4 °C) (Infrared)   Resp 18   Ht 180.3 cm (71\")   Wt 92.9 kg (204 lb 12.8 oz)   SpO2 99%   BMI 28.56 kg/m²       Physical Exam  Vitals and nursing note reviewed.   Constitutional:       Appearance: Normal appearance. He is well-developed.   HENT:      Head: Normocephalic and atraumatic.   Eyes:      Conjunctiva/sclera: Conjunctivae normal.   Cardiovascular:      Rate and Rhythm: Normal rate and regular rhythm.      Heart sounds: Normal heart sounds.   Pulmonary:      Effort: Pulmonary effort is normal. No respiratory " distress.      Breath sounds: Normal breath sounds.   Abdominal:      General: Bowel sounds are normal. There is no distension.      Palpations: Abdomen is soft.      Tenderness: There is no abdominal tenderness.   Musculoskeletal:      Cervical back: Normal range of motion.   Skin:     General: Skin is warm and dry.   Neurological:      Mental Status: He is alert and oriented to person, place, and time.   Psychiatric:         Speech: Speech normal.         Behavior: Behavior normal.         Thought Content: Thought content normal.         Judgment: Judgment normal.                   Diagnoses and all orders for this visit:    1. Annual physical exam (Primary)    2. Anxiety and depression  -     buPROPion XL (Wellbutrin XL) 300 MG 24 hr tablet; Take 1 tablet by mouth Daily.  Dispense: 30 tablet; Refill: 3    3. Alcohol abuse    4. Flu vaccine need  -     Fluzone >6 Months (0576-8298)    5. Need for Tdap vaccination  -     Tdap Vaccine Greater Than or Equal To 8yo IM    6. Need for pneumococcal vaccine  -     Pneumococcal Conjugate Vaccine 20-Valent (PCV20)      Anxiety and depression.  Improving. No suicidal thoughts currently. No safety risks identified. He is tolerating the Wellbutrin very well, but did not tolerate the Lexapro. I will increase his dose of the Wellbutrin to 300 mg daily and evaluate in 4 to 6 weeks his response to that. If he has any issues in the meantime, he should reach out to me.    Preventative care.  Labs reviewed from recent blood work in Epic. We will update influenza, tetanus, and pneumonia vaccinations. He declined COVID-19 vaccination today.    Alcohol abuse.  I encouraged cessation. He was on naltrexone injections, but just felt like he did worse with those, so he is no longer doing those. He does not wish to see a counselor at this point. He will let me know if he changes his mind in the future.     Labs  ordered as above- will notify of results and treat accordingly. If patient has  not received results within one week via mychart or letter, they will notify my office  Immunizations and screenings:   Other preventative/screenings are up-to-date/addressed as noted in the HPI.  Counseling: The patient is advised to attempt to lose weight, decrease or avoid alcohol intake, continue current medications, and return for routine annual checkups  Follow up: Return in about 6 weeks (around 3/20/2024) for Recheck.  Plan of care discussed with pt. They verbalized understanding and agreement.     Electronically signed by : ALEXANDER Amos    2/7/2024   12:51 EST      Transcribed from ambient dictation for ALEXANDER Amos by Shun Arevalo.  02/07/24   11:43 EST    Patient or patient representative verbalized consent to the visit recording.  I have personally performed the services described in this document as transcribed by the above individual, and it is both accurate and complete.  ALEXANDER Amos  2/7/2024  12:51 EST

## 2024-02-07 NOTE — LETTER
[facility]  Vaccine Consent Form    Patient Name:  Riaz Dumont  Patient :  1990     Vaccine(s) Ordered    Pneumococcal Conjugate Vaccine 20-Valent (PCV20)  Fluzone >6 Months ([id number])  Tdap Vaccine Greater Than or Equal To 6yo IM        Screening Checklist  The following questions should be completed prior to vaccination. If you answer “yes” to any question, it does not necessarily mean you should not be vaccinated. It just means we may need to clarify or ask more questions. If a question is unclear, please ask your healthcare provider to explain it.    Yes No   Any fever or moderate to severe illness today (mild illness and/or antibiotic treatment are not contraindications)?     Do you have a history of a serious reaction to any previous vaccinations, such as anaphylaxis, encephalopathy within 7 days, Guillain-[place] syndrome within 6 weeks, seizure?     Have you received any live vaccine(s) (e.g MMR, DAVE) or any other vaccines in the last month (to ensure duplicate doses aren't given)?     Do you have an anaphylactic allergy to latex (DTaP, DTaP-IPV, Hep A, Hep B, MenB, RV, Td, Tdap), baker’s yeast (Hep B, HPV), polysorbates (RSV, nirsevimab, PCV 20, Rotavirrus, Tdap, Shingrix), or gelatin (DAVE, MMR)?     Do you have an anaphylactic allergy to neomycin (Rabies, DAVE, MMR, IPV, Hep A), polymyxin B (IPV), or streptomycin (IPV)?      Any cancer, leukemia, AIDS, or other immune system disorder? (DAVE, MMR, RV)     Do you have a parent, brother, or sister with an immune system problem (if immune competence of vaccine recipient clinically verified, can proceed)? (MMR, DAVE)     Any recent steroid treatments for >2 weeks, chemotherapy, or radiation treatment? (DAVE, MMR)     Have you received antibody-containing blood transfusions or IVIG in the past 11 months (recommended interval is dependent on product)? (MMR, DAEV)     Have you taken antiviral drugs (acyclovir, famciclovir, valacyclovir for DAVE) in the  "last 24 or 48 hours, respectively?      Are you pregnant or planning to become pregnant within 1 month? (DAVE, MMR, HPV, IPV, MenB, Abrexvy; For Hep B- refer to Engerix-B; For RSV - Abrysvo is indicated for 32-36 weeks of pregnancy from September to January)     For infants, have you ever been told your child has had intussusception or a medical emergency involving obstruction of the intestine (Rotavirus)? If not for an infant, can skip this question.         *Ordering Physicians/APC should be consulted if \"yes\" is checked by the patient or guardian above.  I have received, read, and understand the Vaccine Information Statement (VIS) for each vaccine ordered.  I have considered my or my child's health status as well as the health status of my close contacts.  I have taken the opportunity to discuss my vaccine questions with my or my child's health care provider.   I have requested that the ordered vaccine(s) be given to me or my child.  I understand the benefits and risks of the vaccines.  I understand that I should remain in the clinic for 15 minutes after receiving the vaccine(s).  _________________________________________________________  Signature of Patient or Parent/Legal Guardian ____________________  Date     "

## 2024-02-19 ENCOUNTER — PATIENT MESSAGE (OUTPATIENT)
Dept: INTERNAL MEDICINE | Facility: CLINIC | Age: 34
End: 2024-02-19
Payer: COMMERCIAL

## 2024-02-19 DIAGNOSIS — F32.A ANXIETY AND DEPRESSION: Primary | ICD-10-CM

## 2024-02-19 DIAGNOSIS — F41.9 ANXIETY AND DEPRESSION: Primary | ICD-10-CM

## 2024-02-19 RX ORDER — BUPROPION HYDROCHLORIDE 150 MG/1
150 TABLET ORAL DAILY
Qty: 30 TABLET | Refills: 3 | Status: SHIPPED | OUTPATIENT
Start: 2024-02-19

## 2024-02-19 NOTE — TELEPHONE ENCOUNTER
From: Riaz Dumont  To: Lashon Yossi  Sent: 2/19/2024 10:51 AM EST  Subject: Wellbutrin     Hey, so I am taking 300mg of welbutrin now instead of 150mg. But I have given it some time and honestly I feel like I have more anxiety now (: anyways can u prescribe 150mg again instead. I have been cutting the pills in half for now but [facility] pharmacy will only give me 300mg right now so didn’t know if you could switch it back. Thanks!

## 2024-03-20 ENCOUNTER — OFFICE VISIT (OUTPATIENT)
Dept: INTERNAL MEDICINE | Facility: CLINIC | Age: 34
End: 2024-03-20
Payer: COMMERCIAL

## 2024-03-20 VITALS
TEMPERATURE: 97.8 F | RESPIRATION RATE: 20 BRPM | WEIGHT: 201.8 LBS | DIASTOLIC BLOOD PRESSURE: 84 MMHG | BODY MASS INDEX: 28.25 KG/M2 | OXYGEN SATURATION: 99 % | HEIGHT: 71 IN | HEART RATE: 86 BPM | SYSTOLIC BLOOD PRESSURE: 116 MMHG

## 2024-03-20 DIAGNOSIS — F10.10 ALCOHOL ABUSE: ICD-10-CM

## 2024-03-20 DIAGNOSIS — F41.9 ANXIETY AND DEPRESSION: Primary | ICD-10-CM

## 2024-03-20 DIAGNOSIS — F32.A ANXIETY AND DEPRESSION: Primary | ICD-10-CM

## 2024-03-20 PROCEDURE — 99213 OFFICE O/P EST LOW 20 MIN: CPT | Performed by: NURSE PRACTITIONER

## 2024-03-20 NOTE — PROGRESS NOTES
Subjective   Riaz Dumont is a 33 y.o. male.     Chief Complaint   Patient presents with    Anxiety    Depression     6 week recheck after going back to 150mg buPROPion XL tablet       PCP: Lashon Sy APRN    History of Present Illness       The patient is a 33-year-old male who is here to follow up on anxiety and depression. At his last visit, he was not tolerating the Lexapro, so we increased his Wellbutrin to 300 mg. After that visit, he messaged me and letting me know that he felt like he was having more anxiety with the 300 mg and wanted to go back to the 150 mg of the Wellbutrin, so we did that. He had nausea with Lexapro.     He has a lot going on in his life, and they are about to move to [place], which is stressful. He is still dealing with PTSD. His work and marriage are also overwhelming, but the medication is working. He feels comfortable with his current dose of Wellbutrin. He denies any issues with his sleep. He still has depression, but he knows it is due to life circumstances. He feels good when he is mad, but there are days.He has difficulty sitting still. He does not remember if he has tried anything else besides Wellbutrin. The Wellbutrin has been great because it has given him energy. He was on Paxil in the past, but he did not feel like anything was working.     He drinks alcohol at least 4 times a week. He feels good with the medication. He consumes alcohol to help him sleep. He does not want to get addicted to sleep gummies.    The following portions of the patient's history were reviewed and updated as appropriate: allergies, current medications, past family history, past medical history, past social history, past surgical history and problem list.    Review of Systems   Constitutional:  Negative for fatigue, fever and unexpected weight loss.   Eyes:  Negative for blurred vision, double vision and visual disturbance.   Respiratory:  Negative for cough, shortness of breath and  wheezing.    Cardiovascular:  Negative for chest pain, palpitations and leg swelling.   Gastrointestinal:  Negative for abdominal pain, constipation, diarrhea, nausea and vomiting.   Genitourinary:  Negative for difficulty urinating, frequency and urgency.   Musculoskeletal:  Negative for arthralgias and myalgias.   Skin:  Negative for color change and rash.   Neurological:  Negative for dizziness, weakness and headache.   Hematological:  Negative for adenopathy. Does not bruise/bleed easily.   Psychiatric/Behavioral:  Positive for depressed mood and stress. Negative for sleep disturbance. The patient is nervous/anxious.        Anxiety MADAY-7    Feeling nervous, anxious or on edge: Several days  Not being able to stop or control worrying: More than half the days  Worrying too much about different things: Nearly every day  Trouble Relaxing: More than half the days  Being so restless that it is hard to sit still: Nearly every day  Becoming easily annoyed or irritable: Several days  Feeling afraid as if something awful might happen: More than half the days  MADAY 7 Total Score: 14  If you checked any problems, how difficult have these problems made it for you to do your work, take care of things at home, or get along with other people: Somewhat difficult      PHQ-9 Depression Screening  Little interest or pleasure in doing things? 1-->several days   Feeling down, depressed, or hopeless? 2-->more than half the days   Trouble falling or staying asleep, or sleeping too much? 0-->not at all   Feeling tired or having little energy? 1-->several days   Poor appetite or overeating? 2-->more than half the days   Feeling bad about yourself - or that you are a failure or have let yourself or your family down? 2-->more than half the days   Trouble concentrating on things, such as reading the newspaper or watching television? 0-->not at all   Moving or speaking so slowly that other people could have noticed? Or the opposite - being so  "fidgety or restless that you have been moving around a lot more than usual? 0-->not at all   Thoughts that you would be better off dead, or of hurting yourself in some way? 0-->not at all   PHQ-9 Total Score 8   If you checked off any problems, how difficult have these problems made it for you to do your work, take care of things at home, or get along with other people? somewhat difficult       Outpatient Medications Marked as Taking for the 3/20/24 encounter (Office Visit) with Lashon Sy APRN   Medication Sig Dispense Refill    buPROPion XL (Wellbutrin XL) 150 MG 24 hr tablet Take 1 tablet by mouth Daily. 30 tablet 3     No Known Allergies        Objective   Physical Exam  Vitals and nursing note reviewed.   Constitutional:       Appearance: Normal appearance. He is well-developed.   HENT:      Head: Normocephalic and atraumatic.   Eyes:      Conjunctiva/sclera: Conjunctivae normal.   Cardiovascular:      Rate and Rhythm: Normal rate and regular rhythm.      Heart sounds: Normal heart sounds.   Pulmonary:      Effort: Pulmonary effort is normal. No respiratory distress.      Breath sounds: Normal breath sounds.   Abdominal:      General: Bowel sounds are normal. There is no distension.      Palpations: Abdomen is soft.      Tenderness: There is no abdominal tenderness.   Musculoskeletal:      Cervical back: Normal range of motion.   Skin:     General: Skin is warm and dry.   Neurological:      Mental Status: He is alert and oriented to person, place, and time.   Psychiatric:         Speech: Speech normal.         Behavior: Behavior normal.         Thought Content: Thought content normal.         Judgment: Judgment normal.         Vitals:    03/20/24 0841   BP: 116/84   BP Location: Right arm   Patient Position: Sitting   Cuff Size: Adult   Pulse: 86   Resp: 20   Temp: 97.8 °F (36.6 °C)   TempSrc: Infrared   SpO2: 99%   Weight: 91.5 kg (201 lb 12.8 oz)   Height: 180.3 cm (71\")     Body mass index is 28.15 " kg/m².  Wt Readings from Last 3 Encounters:   03/20/24 91.5 kg (201 lb 12.8 oz)   02/07/24 92.9 kg (204 lb 12.8 oz)   10/25/23 90.1 kg (198 lb 9.6 oz)             Assessment & Plan   Diagnoses and all orders for this visit:    1. Anxiety and depression (Primary)  -     sertraline (Zoloft) 50 MG tablet; Take 1 tablet by mouth Daily.  Dispense: 30 tablet; Refill: 1    2. Alcohol abuse      Anxiety and depression.  This is uncontrolled. He is tolerating the Wellbutrin great. We will plan to continue that as he has failed Lexapro and other meds in the past. I will also go ahead and add Zoloft 50 mg. He will take half a pill for the first 1 to 2 weeks and then increase. He does have some Zofran at home if he needs it for nausea.     Alcohol use- encouraged decrease/cessation      Return in about 4 weeks (around 4/17/2024) for Recheck, Video.    I discussed my findings,recommendations, and plan of care was with the patient. They verbalized understanding and agreement.  Patient was encouraged to keep me informed of any acute changes, lack of improvement, or any new concerning symptoms.   Transcribed from ambient dictation for ALEXANDER Amos by Brielle Leo.  03/20/24   09:39 EDT    Patient or patient representative verbalized consent to the visit recording.  I have personally performed the services described in this document as transcribed by the above individual, and it is both accurate and complete.  ALEXANDER Amos  3/20/2024  10:28 EDT

## 2024-04-17 ENCOUNTER — TELEMEDICINE (OUTPATIENT)
Dept: INTERNAL MEDICINE | Facility: CLINIC | Age: 34
End: 2024-04-17
Payer: COMMERCIAL

## 2024-04-17 DIAGNOSIS — F10.10 ALCOHOL ABUSE: ICD-10-CM

## 2024-04-17 DIAGNOSIS — F41.9 ANXIETY AND DEPRESSION: Primary | ICD-10-CM

## 2024-04-17 DIAGNOSIS — F32.A ANXIETY AND DEPRESSION: Primary | ICD-10-CM

## 2024-04-17 PROCEDURE — 99214 OFFICE O/P EST MOD 30 MIN: CPT | Performed by: NURSE PRACTITIONER

## 2024-04-17 NOTE — PROGRESS NOTES
This was an audio and video enabled visit.   This provider is located at   the [facility] (through [facility]), [address], [place] [address] using a secure [employer]hart Video Visit through [employer] or [employer] (pt preference). Riaz  is being seen remotely via telehealth  in [place]. Pt provided a secure environment for this session.  Riaz may be asked to present for in office testing and/or evaluation if felt to be unsafe for telemedicine.    The provider identified herself /credentials as appropriate.   By proceeding with the telehealth visit, the patient consents to be seen remotely which allows for patient identifiable information to be sent to a third party as needed. The patient may refuse to be seen remotely at any time. The electronic data is encrypted and password protected, and the patient is advised of the potential risks to privacy not withstanding such measures.    You have chosen to receive care through a telehealth visit. Do you consent to use a video/audio connection for your medical care today? Yes      Subjective   Riaz Dumont is a 34 y.o. male.     Chief Complaint   Patient presents with    Anxiety    Depression       Anxiety  His past medical history is significant for depression.   Depression  His past medical history is significant for depression.    /ROS    History of Present Illness  The patient presents via virtual visit for evaluation of depression.    The patient experienced severe illness(nausea) after splitting his Zoloft dosage over a two-day period. Approximately 2 to 3 weeks ago, he  and his partner procured a house in [place], which subsequently led to a divorce. Currently, he  is in the process of selling the house in [place]  and securing a new apartment in [place], with plans to relocate in a month. He  reports an exacerbation of his depression and is currently on a regimen of Wellbutrin 150. He  expressed  interest in resuming Wellbutrin 300 but uncertain given the increased anxiety he had when he tried that mims before.  He denies any suicidal ideation. his alcohol consumption remains consistent, approximately four times a week. his depressive symptoms initially improved, but have since resurfaced. he acknowledges a gradual improvement in his condition over the past two days, but is actively caring for his children. he has scheduled an appointment with a counselor through his employer on 05/07/2024. He reports significant life stressors related to work (EMT/), including deaths of four children, a 12-year-old killed himself, an 18-year-old was murder, and also hios personal stress related to currently .    Results      The following portions of the patient's history were reviewed and updated as appropriate: allergies, current medications, past family history, past medical history, past social history, past surgical history and problem list.            Outpatient Medications Marked as Taking for the 4/17/24 encounter (Telemedicine) with Lashon Sy APRN   Medication Sig Dispense Refill    buPROPion XL (Wellbutrin XL) 150 MG 24 hr tablet Take 1 tablet by mouth Daily. 30 tablet 3     No Known Allergies        Objective     Physical Exam   Constitutional: He is oriented to person, place, and time. He appears well-developed and well-nourished. No distress.   HENT:   Head: Normocephalic and atraumatic.   Right Ear: External ear normal.   Left Ear: External ear normal.   Mouth/Throat: Oropharynx is clear and moist.   Eyes: Right eye exhibits no discharge. Left eye exhibits no discharge. No scleral icterus.   Neck: Neck normal appearance.  Pulmonary/Chest: Effort normal. No accessory muscle usage.  No respiratory distress.No use of oxygen by nasal cannula  Abdominal: Abdomen appears normal.   Neurological: He is alert and oriented to person, place, and time.   Skin: Skin is dry. He is not diaphoretic.  No erythema. No pallor.   Psychiatric: He has a normal mood and affect. His speech is normal and behavior is normal. Judgment normal. He is attentive.         There were no vitals filed for this visit.  There is no height or weight on file to calculate BMI.        Assessment & Plan     Diagnoses and all orders for this visit:    1. Anxiety and depression (Primary)  -     Cariprazine HCl (Vraylar) 1.5 MG capsule capsule; Take 1 capsule by mouth Daily.  Dispense: 30 capsule; Refill: 1    2. Alcohol abuse        Assessment & Plan  1. Depression and anxiety.  The patient was counseled on the importance of reducing her alcohol consumption. A prescription for Vraylar 1.5 mg daily was issued. The continuation of Wellbutrin was recommended.    Follow-up  The patient is scheduled for a follow-up visit in 3 to 4 weeks, which will be conducted via a video visit.           Return in about 3 weeks (around 5/8/2024) for Recheck, Video.    I have reviewed the limitations of a telehealth visit (such as lack of a physical exam and unable to obtain vital signs) and advised the patient that they may need to follow up for an office visit should their symptoms or concerns persist, worsen, or change.  Patient was encouraged to keep me informed of any acute changes, lack of improvement, or any new concerning symptoms.   I discussed my findings,recommendations, and plan of care was with the patient. They verbalized understanding and agreement.    Patient or patient representative verbalized consent for the use of Ambient Listening during the visit with  ALEXANDER Amos for chart documentation. 4/17/2024  12:06 EDT

## 2024-04-26 ENCOUNTER — PATIENT MESSAGE (OUTPATIENT)
Dept: INTERNAL MEDICINE | Facility: CLINIC | Age: 34
End: 2024-04-26
Payer: COMMERCIAL

## 2024-04-26 RX ORDER — HYDROXYZINE HYDROCHLORIDE 25 MG/1
25 TABLET, FILM COATED ORAL 3 TIMES DAILY PRN
Qty: 90 TABLET | Refills: 0 | Status: SHIPPED | OUTPATIENT
Start: 2024-04-26

## 2024-04-26 NOTE — TELEPHONE ENCOUNTER
Meseret Chang MA 4/26/2024 9:20 AM EDT    Any suggestions for over the counter or an appointment?  ----- Message -----  From: Riaz Dumont  Sent: 4/26/2024 8:47 AM EDT  To: Li Fair  Clinical Pool  Subject: Sleep     Kimi Oates this is Riaz Dumont. So I haven’t been able to sleep for like a week now. I average about 4 hrs due to the unfortunate circumstances. I have tried melatonin 15mg and it’s not helping at all. So didn’t know if there was another option. Thanks!

## 2024-05-08 ENCOUNTER — TELEMEDICINE (OUTPATIENT)
Dept: INTERNAL MEDICINE | Facility: CLINIC | Age: 34
End: 2024-05-08
Payer: COMMERCIAL

## 2024-05-08 DIAGNOSIS — F10.10 ALCOHOL ABUSE: ICD-10-CM

## 2024-05-08 DIAGNOSIS — F32.A ANXIETY AND DEPRESSION: Primary | ICD-10-CM

## 2024-05-08 DIAGNOSIS — F41.9 ANXIETY AND DEPRESSION: Primary | ICD-10-CM

## 2024-05-08 PROCEDURE — 99214 OFFICE O/P EST MOD 30 MIN: CPT | Performed by: NURSE PRACTITIONER

## 2024-06-04 ENCOUNTER — TELEMEDICINE (OUTPATIENT)
Dept: INTERNAL MEDICINE | Facility: CLINIC | Age: 34
End: 2024-06-04
Payer: COMMERCIAL

## 2024-06-04 DIAGNOSIS — F10.10 ALCOHOL ABUSE: ICD-10-CM

## 2024-06-04 DIAGNOSIS — F41.9 ANXIETY AND DEPRESSION: Primary | ICD-10-CM

## 2024-06-04 DIAGNOSIS — F32.A ANXIETY AND DEPRESSION: Primary | ICD-10-CM

## 2024-06-04 PROCEDURE — 99214 OFFICE O/P EST MOD 30 MIN: CPT | Performed by: NURSE PRACTITIONER

## 2024-06-04 RX ORDER — BUPROPION HYDROCHLORIDE 150 MG/1
150 TABLET ORAL DAILY
Qty: 90 TABLET | Refills: 1 | Status: SHIPPED | OUTPATIENT
Start: 2024-06-04

## 2024-06-04 RX ORDER — HYDROXYZINE HYDROCHLORIDE 25 MG/1
25 TABLET, FILM COATED ORAL 3 TIMES DAILY PRN
Qty: 90 TABLET | Refills: 0 | Status: SHIPPED | OUTPATIENT
Start: 2024-06-04

## 2024-06-04 NOTE — PROGRESS NOTES
This was an audio and video enabled visit.   This provider is located at their home address in [place] using a secure [employer] Video Visit through [employer]. Riaz  is being seen remotely via telehealth  in [place]. Pt provided a secure environment for this session.  Riaz may be asked to present for in office testing and/or evaluation if felt to be unsafe for telemedicine.    The provider identified herself /credentials as appropriate.   By proceeding with the telehealth visit, the patient consents to be seen remotely which allows for patient identifiable information to be sent to a third party as needed. The patient may refuse to be seen remotely at any time. The electronic data is encrypted and password protected, and the patient is advised of the potential risks to privacy not withstanding such measures.    You have chosen to receive care through a telehealth visit. Do you consent to use a video/audio connection for your medical care today? Yes      Subjective   Riaz Dumont is a 34 y.o. male.     Chief Complaint   Patient presents with    Anxiety    Depression    Alcohol Problem       History of Present Illness     History of Present Illness  The patient is a 34-year-old male who is following up via video visit for anxiety and depression. At his last visit, we increased his Vraylar to 3 mg.    The patient has reverted to his Vraylar dosage from 3 mg to 1.5 mg following a two-week trial period due to severe nocturnal tremors. Upon cessation of the medication, the tremors have ceased. His mood has shown improvement, attributing this to self-acceptions. He perceives his current treatment as beneficial, but expresses disinterest in adjusting his medication dosage. His current medication regimen includes Wellbutrin 150 mg daily and Vraylar 1.5 mg. He denies experiencing suicidal or homicidal ideation. His sleep patterns are normal, and he denies experiencing panic attacks. His work performance remains  satisfactory. He consumes alcohol approximately every 3 to 4 days. He has attended counseling sessions twice, but expresses uncertainty about their efficacy. His ongoing divorce process is ongoing, with plans to relocate to [place] next week. He occasionally uses hydroxyzine.    The following portions of the patient's history were reviewed and updated as appropriate: allergies, current medications, past family history, past medical history, past social history, past surgical history and problem list.        Outpatient Medications Marked as Taking for the 6/4/24 encounter (Telemedicine) with Lashon Sy APRN   Medication Sig Dispense Refill    buPROPion XL (Wellbutrin XL) 150 MG 24 hr tablet Take 1 tablet by mouth Daily. 90 tablet 1    Cariprazine HCl (Vraylar) 1.5 MG capsule capsule Take 1 capsule by mouth Daily. 90 capsule 1    hydrOXYzine (ATARAX) 25 MG tablet Take 1 tablet by mouth 3 (Three) Times a Day As Needed for Anxiety. 90 tablet 0     No Known Allergies        Objective     Physical Exam   Constitutional: He is oriented to person, place, and time. He appears well-developed and well-nourished. No distress.   HENT:   Head: Normocephalic and atraumatic.   Right Ear: External ear normal.   Left Ear: External ear normal.   Mouth/Throat: Oropharynx is clear and moist.   Eyes: Right eye exhibits no discharge. Left eye exhibits no discharge. No scleral icterus.   Neck: Neck normal appearance.  Pulmonary/Chest: Effort normal. No accessory muscle usage.  No respiratory distress.No use of oxygen by nasal cannula  Abdominal: Abdomen appears normal.   Neurological: He is alert and oriented to person, place, and time.   Skin: Skin is dry. He is not diaphoretic. No erythema. No pallor.   Psychiatric: He has a normal mood and affect. His speech is normal and behavior is normal. Judgment normal. He is attentive.         There were no vitals filed for this visit.  There is no height or weight on file to calculate  BMI.        Assessment & Plan   Diagnoses and all orders for this visit:    1. Anxiety and depression (Primary)  -     hydrOXYzine (ATARAX) 25 MG tablet; Take 1 tablet by mouth 3 (Three) Times a Day As Needed for Anxiety.  Dispense: 90 tablet; Refill: 0  -     buPROPion XL (Wellbutrin XL) 150 MG 24 hr tablet; Take 1 tablet by mouth Daily.  Dispense: 90 tablet; Refill: 1  -     Cariprazine HCl (Vraylar) 1.5 MG capsule capsule; Take 1 capsule by mouth Daily.  Dispense: 90 capsule; Refill: 1    2. Alcohol abuse          Assessment & Plan  1. Anxiety and depression/alcohol use  The patient is advised to persist with the current dosage of Vraylar 1.5 mg and Wellbutrin 150 mg, with refills provided. Additionally, the patient is encouraged to reduce his alcohol consumption. Should the patient express a desire to alter his medication regimen, he is to inform us immediately.    Follow-up  The patient is scheduled for a follow-up visit in 2 to 3 months.           Return in about 3 months (around 9/4/2024) for chronic condition follow up.    I have reviewed the limitations of a telehealth visit (such as lack of a physical exam and unable to obtain vital signs) and advised the patient that they may need to follow up for an office visit should their symptoms or concerns persist, worsen, or change.  Patient was encouraged to keep me informed of any acute changes, lack of improvement, or any new concerning symptoms.   I discussed my findings,recommendations, and plan of care was with the patient. They verbalized understanding and agreement.    Patient or patient representative verbalized consent for the use of Ambient Listening during the visit with  ALEXANDER Amos for chart documentation. 6/4/2024  15:10 EDT

## 2024-08-05 ENCOUNTER — TELEMEDICINE (OUTPATIENT)
Dept: INTERNAL MEDICINE | Facility: CLINIC | Age: 34
End: 2024-08-05
Payer: COMMERCIAL

## 2024-08-05 DIAGNOSIS — F32.A ANXIETY AND DEPRESSION: ICD-10-CM

## 2024-08-05 DIAGNOSIS — R53.83 FATIGUE, UNSPECIFIED TYPE: Primary | ICD-10-CM

## 2024-08-05 DIAGNOSIS — F41.9 ANXIETY AND DEPRESSION: ICD-10-CM

## 2024-08-05 PROCEDURE — 99214 OFFICE O/P EST MOD 30 MIN: CPT | Performed by: NURSE PRACTITIONER

## 2024-08-05 RX ORDER — BUPROPION HYDROCHLORIDE 300 MG/1
300 TABLET ORAL DAILY
Qty: 90 TABLET | Refills: 1 | Status: SHIPPED | OUTPATIENT
Start: 2024-08-05

## 2024-08-05 NOTE — PROGRESS NOTES
This was an audio and video enabled visit.   This provider is located at [facility] located at [address] [place] [address] using a secure MyChart Video Visit through [id number]. Riaz  is being seen remotely via telehealth  in [place]. Pt provided a secure environment for this session.  Riaz may be asked to present for in office testing and/or evaluation if felt to be unsafe for telemedicine.    The provider identified herself /credentials as appropriate.   By proceeding with the telehealth visit, the patient consents to be seen remotely which allows for patient identifiable information to be sent to a third party as needed. The patient may refuse to be seen remotely at any time. The electronic data is encrypted and password protected, and the patient is advised of the potential risks to privacy not withstanding such measures.    You have chosen to receive care through a telehealth visit. Do you consent to use a video/audio connection for your medical care today? Yes      Subjective   Riaz Dumont is a 34 y.o. male.     Chief Complaint   Patient presents with    Anxiety    Depression    Fatigue       History of Present Illness   Riaz is a 34-year-old male who is following up via video visit for anxiety depression and ongoing fatigue.  At his last visit with me we continued Vraylar 1.5 mg and Wellbutrin 150 mg.  He believes that this has been helpful for him although he is experiencing significant fatigue.  He does still imbibe in alcohol consumption although is trying to minimize this and does not do it every day now he previously trialed Vraylar 3 mg but was a bit too much for him and caused nocturnal tremors.  After decreasing the dose the tremor resolved.  He does believe that he would benefit from a slight adjustment in medications.  He reports that he is sleeping pretty well.  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  He does report he has not been taking his  medications at the same time each day he does have stressors in his life as he going through divorce and he recently moved to a new town.  He feels as though overall this is going fairly well and he is just trying to get settled and adjusted.  Denies any HI/SI.    Results      Lab Results   Component Value Date    WBC 6.06 12/14/2023    HGB 15.7 12/14/2023    HCT 43.5 12/14/2023    MCV 94 12/14/2023     12/14/2023     Lab Results   Component Value Date    GLUCOSE 86 09/22/2023    BUN 11 09/22/2023    CREATININE 1.14 09/22/2023    EGFR 87.1 09/22/2023    BCR 9.6 09/22/2023    K 3.8 09/22/2023    CO2 24.7 09/22/2023    CALCIUM 10.0 09/22/2023    ALBUMIN 4.9 09/22/2023    BILITOT 0.9 09/22/2023    AST 20 09/22/2023    ALT 14 09/22/2023     Lab Results   Component Value Date    CHOL 218 (H) 09/22/2023    TRIG 99 09/22/2023    HDL 52 09/22/2023     (H) 09/22/2023     Lab Results   Component Value Date    TSH 0.995 09/22/2023     A1C Last 3 Results          9/22/2023    10:44   HGBA1C Last 3 Results   Hemoglobin A1C 4.70          The following portions of the patient's history were reviewed and updated as appropriate: allergies, current medications, past family history, past medical history, past social history, past surgical history and problem list.        Review of Systems   Constitutional:  Positive for fatigue. Negative for fever and unexpected weight loss.   Eyes:  Negative for blurred vision, double vision and visual disturbance.   Respiratory:  Negative for cough, shortness of breath and wheezing.    Cardiovascular:  Negative for chest pain, palpitations and leg swelling.   Gastrointestinal:  Negative for abdominal pain, constipation, diarrhea, nausea and vomiting.   Genitourinary:  Negative for difficulty urinating, frequency and urgency.   Musculoskeletal:  Negative for arthralgias and myalgias.   Skin:  Negative for color change and rash.   Neurological:  Negative for dizziness, weakness and  headache.   Hematological:  Negative for adenopathy. Does not bruise/bleed easily.   Psychiatric/Behavioral:  Positive for decreased concentration, sleep disturbance, depressed mood (improved) and stress. Negative for self-injury and suicidal ideas. The patient is nervous/anxious (improved).            Outpatient Medications Marked as Taking for the 8/5/24 encounter (Telemedicine) with Lashon Sy APRARSH   Medication Sig Dispense Refill    buPROPion XL (Wellbutrin XL) 300 MG 24 hr tablet Take 1 tablet by mouth Daily. 90 tablet 1     No Known Allergies        Objective     Physical Exam   Constitutional: He is oriented to person, place, and time. He appears well-developed and well-nourished. No distress.   HENT:   Head: Normocephalic and atraumatic.   Right Ear: External ear normal.   Left Ear: External ear normal.   Mouth/Throat: Oropharynx is clear and moist.   Eyes: Right eye exhibits no discharge. Left eye exhibits no discharge. No scleral icterus.   Neck: Neck normal appearance.  Pulmonary/Chest: Effort normal. No accessory muscle usage.  No respiratory distress.No use of oxygen by nasal cannula  Abdominal: Abdomen appears normal.   Neurological: He is alert and oriented to person, place, and time.   Skin: Skin is dry. He is not diaphoretic. No erythema. No pallor.   Psychiatric: He has a normal mood and affect. His speech is normal and behavior is normal. Judgment and thought content normal. He expresses no homicidal and no suicidal ideation. He expresses no suicidal plans and no homicidal plans. He is attentive.         There were no vitals filed for this visit.  There is no height or weight on file to calculate BMI.        Assessment & Plan     Diagnoses and all orders for this visit:    1. Fatigue, unspecified type (Primary)  -     Testosterone, Free, Total; Future  -     Vitamin B12; Future  -     Iron Profile; Future  -     Ferritin; Future  -     Vitamin D 25 hydroxy; Future  -     CBC (No Diff);  Future  -     Comprehensive Metabolic Panel; Future  -     Folate; Future  -     Methylmalonic Acid, Serum; Future  -     Homocysteine; Future    2. Anxiety and depression  -     buPROPion XL (Wellbutrin XL) 300 MG 24 hr tablet; Take 1 tablet by mouth Daily.  Dispense: 90 tablet; Refill: 1        Increase bupropion to 300 mg daily.  Continue Vraylar at 1.5 mg.  Did not tolerate higher dose of the Vraylar.  Will also check labs as above evaluate any underlying conditions contributing to symptoms       Return in about 4 weeks (around 9/2/2024) for Recheck.    I have reviewed the limitations of a telehealth visit (such as lack of a physical exam and unable to obtain vital signs) and advised the patient that they may need to follow up for an office visit should their symptoms or concerns persist, worsen, or change.  Patient was encouraged to keep me informed of any acute changes, lack of improvement, or any new concerning symptoms.   I discussed my findings,recommendations, and plan of care was with the patient. They verbalized understanding and agreement.

## 2024-09-04 ENCOUNTER — TELEMEDICINE (OUTPATIENT)
Dept: INTERNAL MEDICINE | Facility: CLINIC | Age: 34
End: 2024-09-04
Payer: COMMERCIAL

## 2024-09-04 DIAGNOSIS — R53.83 FATIGUE, UNSPECIFIED TYPE: ICD-10-CM

## 2024-09-04 DIAGNOSIS — F32.A ANXIETY AND DEPRESSION: Primary | ICD-10-CM

## 2024-09-04 DIAGNOSIS — F41.9 ANXIETY AND DEPRESSION: Primary | ICD-10-CM

## 2024-09-04 DIAGNOSIS — F10.10 ALCOHOL ABUSE: ICD-10-CM

## 2024-09-04 PROCEDURE — 99214 OFFICE O/P EST MOD 30 MIN: CPT | Performed by: NURSE PRACTITIONER

## 2024-09-04 NOTE — PROGRESS NOTES
This was an audio and video enabled visit.   This provider is located at [facility] [place], [place] using a secure [employer] Video Visit through [employer]. Riaz  is being seen remotely via telehealth  in [place].  provided a secure environment for this session.  Riaz may be asked to present for in office testing and/or evaluation if felt to be unsafe for telemedicine.    The provider identified herself /credentials as appropriate.   By proceeding with the telehealth visit, the patient consents to be seen remotely which allows for patient identifiable information to be sent to a third party as needed. The patient may refuse to be seen remotely at any time. The electronic data is encrypted and password protected, and the patient is advised of the potential risks to privacy not withstanding such measures.    You have chosen to receive care through a telehealth visit. Do you consent to use a video/audio connection for your medical care today? Yes      Subjective   Riaz Dumont is a 34 y.o. male.     Chief Complaint   Patient presents with    Anxiety    Depression       History of Present Illness     History of Present Illness  The patient is a 34-year-old male who is being seen by video visit today for follow-up on anxiety, depression, and fatigue.    At his last visit, bupropion was increased to 300 mg daily, and Vraylar was continued at 1.5 mg as he did not tolerate higher doses historically. Labs dated 08/05/2024 have not been collected yet to evaluate his fatigue.    He reports that his anxiety and depression are well-managed with the current dosage of bupropion, which he tolerates without any issues. He does not experience feelings of anxiety, restlessness, or jitteriness. While he still experiences some depressive symptoms, he does not feel hopeless, downcast, or suicidal. His work continues to be a source of stress, with occasional flashbacks related to his job. He does not report any sleep  disturbances. He expresses a desire to maintain the current medication regimen due to fear of potential side effects from an increased dose.    Results        Lab Results   Component Value Date    WBC 6.06 12/14/2023    HGB 15.7 12/14/2023    HCT 43.5 12/14/2023    MCV 94 12/14/2023     12/14/2023     Lab Results   Component Value Date    GLUCOSE 86 09/22/2023    BUN 11 09/22/2023    CREATININE 1.14 09/22/2023    EGFR 87.1 09/22/2023    BCR 9.6 09/22/2023    K 3.8 09/22/2023    CO2 24.7 09/22/2023    CALCIUM 10.0 09/22/2023    ALBUMIN 4.9 09/22/2023    BILITOT 0.9 09/22/2023    AST 20 09/22/2023    ALT 14 09/22/2023     Lab Results   Component Value Date    CHOL 218 (H) 09/22/2023    TRIG 99 09/22/2023    HDL 52 09/22/2023     (H) 09/22/2023     Lab Results   Component Value Date    TSH 0.995 09/22/2023     A1C Last 3 Results          9/22/2023    10:44   HGBA1C Last 3 Results   Hemoglobin A1C 4.70          The following portions of the patient's history were reviewed and updated as appropriate: allergies, current medications, past family history, past medical history, past social history, past surgical history and problem list.      No outpatient medications have been marked as taking for the 9/4/24 encounter (Telemedicine) with Lashon Sy APRN.     No Known Allergies        Objective     Physical Exam   Constitutional: He is oriented to person, place, and time. He appears well-developed and well-nourished. No distress.   HENT:   Head: Normocephalic and atraumatic.   Right Ear: External ear normal.   Left Ear: External ear normal.   Mouth/Throat: Oropharynx is clear and moist.   Eyes: Right eye exhibits no discharge. Left eye exhibits no discharge. No scleral icterus.   Neck: Neck normal appearance.  Pulmonary/Chest: Effort normal. No accessory muscle usage.  No respiratory distress.No use of oxygen by nasal cannula  Abdominal: Abdomen appears normal.   Neurological: He is alert and oriented to  person, place, and time.   Skin: Skin is dry. He is not diaphoretic. No erythema. No pallor.   Psychiatric: He has a normal mood and affect. His speech is normal and behavior is normal. Judgment and thought content normal. Thought content is not paranoid. He expresses no homicidal and no suicidal ideation. He exhibits no loose associations. He is attentive.         There were no vitals filed for this visit.  There is no height or weight on file to calculate BMI.        Assessment & Plan     Diagnoses and all orders for this visit:    1. Anxiety and depression (Primary)    2. Fatigue, unspecified type    3. Alcohol abuse        Assessment & Plan  1. Anxiety.  His anxiety is well-controlled with the current medication regimen. He reports no feelings of anxiousness, restlessness, or jitteriness. He will continue taking Vraylar 1.5 mg daily. If his anxiety symptoms worsen, an earlier follow-up will be scheduled.    2. Depression.  His depression has improved with the increased dose of bupropion to 300 mg daily. He reports some residual depression but no feelings of hopelessness or suicidal ideation. He will continue with the current dosage of bupropion 300 mg daily. If his depression worsens, a dosage increase may be considered.    3. Fatigue.  The labs dated 8/5/2024 to evaluate his fatigue have not been collected yet. He is advised to get the labs done as soon as possible. Further treatment will depend on the lab results.    4. Alcohol use   Encouraged absrinence  Follow-up  The patient will follow up in 3 months, or sooner if necessary.           Return in about 3 months (around 12/4/2024).    I have reviewed the limitations of a telehealth visit (such as lack of a physical exam and unable to obtain vital signs) and advised the patient that they may need to follow up for an office visit should their symptoms or concerns persist, worsen, or change.  Patient was encouraged to keep me informed of any acute changes, lack  of improvement, or any new concerning symptoms.   I discussed my findings,recommendations, and plan of care was with the patient. They verbalized understanding and agreement.    Patient or patient representative verbalized consent for the use of Ambient Listening during the visit with  ALEXANDER Amos for chart documentation. 9/9/2024  16:07 EDT

## 2024-09-23 ENCOUNTER — PRIOR AUTHORIZATION (OUTPATIENT)
Dept: INTERNAL MEDICINE | Facility: CLINIC | Age: 34
End: 2024-09-23
Payer: COMMERCIAL

## 2024-10-25 ENCOUNTER — OFFICE VISIT (OUTPATIENT)
Dept: INTERNAL MEDICINE | Facility: CLINIC | Age: 34
End: 2024-10-25
Payer: COMMERCIAL

## 2024-10-25 VITALS
TEMPERATURE: 97.7 F | BODY MASS INDEX: 28.25 KG/M2 | WEIGHT: 201.8 LBS | RESPIRATION RATE: 18 BRPM | OXYGEN SATURATION: 98 % | SYSTOLIC BLOOD PRESSURE: 108 MMHG | HEIGHT: 71 IN | DIASTOLIC BLOOD PRESSURE: 68 MMHG | HEART RATE: 82 BPM

## 2024-10-25 DIAGNOSIS — R20.2 NUMBNESS AND TINGLING IN LEFT HAND: ICD-10-CM

## 2024-10-25 DIAGNOSIS — F41.9 ANXIETY AND DEPRESSION: Primary | ICD-10-CM

## 2024-10-25 DIAGNOSIS — F32.A ANXIETY AND DEPRESSION: Primary | ICD-10-CM

## 2024-10-25 DIAGNOSIS — R20.0 NUMBNESS AND TINGLING IN LEFT HAND: ICD-10-CM

## 2024-10-25 DIAGNOSIS — F10.10 ALCOHOL ABUSE: ICD-10-CM

## 2024-10-25 PROCEDURE — 99214 OFFICE O/P EST MOD 30 MIN: CPT | Performed by: NURSE PRACTITIONER

## 2024-10-25 RX ORDER — BUSPIRONE HYDROCHLORIDE 10 MG/1
10 TABLET ORAL 2 TIMES DAILY
Qty: 60 TABLET | Refills: 3 | Status: SHIPPED | OUTPATIENT
Start: 2024-10-25

## 2024-10-25 NOTE — PROGRESS NOTES
Subjective   Riaz Dumont is a 34 y.o. male.     Chief Complaint   Patient presents with    Anxiety    Depression    Hand Pain       PCP: Lashon Sy APRN    History of Present Illness     History of Present Illness  The patient presents for a routine follow-up on depression and anxiety.    He has discontinued the use of Vraylar, reporting no noticeable changes since stopping the medication. He is currently taking Wellbutrin but admits to occasionally forgetting to take it. He reports feeling fine without it but notes that he experienced shaking similar to caffeine withdrawal when he stopped taking it. He also reports increased energy levels when he takes it. He is not interested in taking Adderall. He has reduced his alcohol consumption to four drinks on his days off, which occur four days a week. He has not set an alarm to remind him to take his medications. He has received his influenza vaccine and is due for his COVID-19 vaccine.    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 His divorce was finalized in 09/2024, and he is managing co-parenting responsibilities. He is also dealing with work-related stressors. He was previously seeing a counselor but has since stopped.    He has been experiencing numbness in his left hand, specifically in the fourth and fifth fingers, for the past month and a half. He also reports weakness in his hand and decreased  strength. He reports no thumb drop. He initially thought the issue originated from his neck, but he no longer experiences neck pain. He has not been taking any anti-inflammatory medications such as ibuprofen or Tylenol.    FAMILY HISTORY  His mother had carpal tunnel syndrome and had surgery.  PHQ-9 Depression Screening  Little interest or pleasure in doing things? Not at all   Feeling down, depressed, or hopeless? Not at all   PHQ-2 Total Score 0   Trouble falling or staying asleep, or sleeping too much?      Feeling tired or having little energy?     Poor appetite or overeating?     Feeling bad about yourself - or that you are a failure or have let yourself or your family down?     Trouble concentrating on things, such as reading the newspaper or watching television?     Moving or speaking so slowly that other people could have noticed? Or the opposite - being so fidgety or restless that you have been moving around a lot more than usual?     Thoughts that you would be better off dead, or of hurting yourself in some way?     PHQ-9 Total Score     If you checked off any problems, how difficult have these problems made it for you to do your work, take care of things at home, or get along with other people?       Anxiety MADAY-7    Feeling nervous, anxious or on edge: Several days  Not being able to stop or control worrying: Several days  Worrying too much about different things: Several days  Trouble Relaxing: Several days  Being so restless that it is hard to sit still: Not at all  Becoming easily annoyed or irritable: Not at all  Feeling afraid as if something awful might happen: Not at all  MADAY 7 Total Score: 4  If you checked any problems, how difficult have these problems made it for you to do your work, take care of things at home, or get along with other people: Somewhat difficult       Results      Lab Results   Component Value Date    WBC 6.06 12/14/2023    HGB 15.7 12/14/2023    HCT 43.5 12/14/2023    MCV 94 12/14/2023     12/14/2023     Lab Results   Component Value Date    GLUCOSE 86 09/22/2023    BUN 11 09/22/2023    CREATININE 1.14 09/22/2023    EGFR 87.1 09/22/2023    BCR 9.6 09/22/2023    K 3.8 09/22/2023    CO2 24.7 09/22/2023    CALCIUM 10.0 09/22/2023    ALBUMIN 4.9 09/22/2023    BILITOT 0.9 09/22/2023    AST 20 09/22/2023    ALT 14 09/22/2023     Lab Results   Component Value Date    CHOL 218 (H) 09/22/2023    TRIG 99 09/22/2023    HDL 52 09/22/2023     (H) 09/22/2023     Lab Results  "  Component Value Date    TSH 0.995 09/22/2023         The following portions of the patient's history were reviewed and updated as appropriate: allergies, current medications, past family history, past medical history, past social history, past surgical history and problem list.              Outpatient Medications Marked as Taking for the 10/25/24 encounter (Office Visit) with Lashon SyALEXANDER   Medication Sig Dispense Refill    buPROPion XL (Wellbutrin XL) 300 MG 24 hr tablet Take 1 tablet by mouth Daily. 90 tablet 1     No Known Allergies        Objective     Vitals:    10/25/24 1430   BP: 108/68   BP Location: Left arm   Patient Position: Sitting   Cuff Size: Adult   Pulse: 82   Resp: 18   Temp: 97.7 °F (36.5 °C)   TempSrc: Infrared   SpO2: 98%   Weight: 91.5 kg (201 lb 12.8 oz)   Height: 180.3 cm (71\")     Body mass index is 28.15 kg/m².  Wt Readings from Last 3 Encounters:   10/25/24 91.5 kg (201 lb 12.8 oz)   03/20/24 91.5 kg (201 lb 12.8 oz)   02/07/24 92.9 kg (204 lb 12.8 oz)       Physical Exam  Vitals and nursing note reviewed.   Constitutional:       Appearance: Normal appearance. He is well-developed.   HENT:      Head: Normocephalic and atraumatic.   Eyes:      Conjunctiva/sclera: Conjunctivae normal.   Neck:      Thyroid: No thyroid mass or thyromegaly.   Cardiovascular:      Rate and Rhythm: Normal rate and regular rhythm.      Pulses:           Radial pulses are 2+ on the right side and 2+ on the left side.      Heart sounds: Normal heart sounds.   Pulmonary:      Effort: Pulmonary effort is normal. No respiratory distress.      Breath sounds: Normal breath sounds.   Abdominal:      General: Bowel sounds are normal. There is no distension.      Palpations: Abdomen is soft.      Tenderness: There is no abdominal tenderness.   Musculoskeletal:      Right wrist: Normal.      Left wrist: Normal.      Right hand: Normal.      Left hand: Normal.      Cervical back: Normal range of motion.   Skin:    "  General: Skin is warm and dry.   Neurological:      Mental Status: He is alert and oriented to person, place, and time.   Psychiatric:         Attention and Perception: Attention and perception normal. He is attentive.         Mood and Affect: Mood is anxious. Mood is not depressed. Affect is not angry or inappropriate.         Speech: Speech normal.         Behavior: Behavior normal.         Thought Content: Thought content normal. Thought content does not include homicidal or suicidal ideation. Thought content does not include homicidal or suicidal plan.         Cognition and Memory: Cognition and memory normal.         Judgment: Judgment normal.         Physical Exam                Assessment & Plan   Diagnoses and all orders for this visit:    1. Anxiety and depression (Primary)    2. Alcohol abuse    3. Numbness and tingling in left hand  -     Ambulatory Referral to Orthopedic Surgery  -     EMG & Nerve Conduction Test; Future    Other orders  -     busPIRone (BUSPAR) 10 MG tablet; Take 1 tablet by mouth 2 (Two) Times a Day.  Dispense: 60 tablet; Refill: 3        Assessment & Plan  1. Depression.  His PHQ and MADAY scores are within the normal range. He stopped taking Vraylar without noticing any significant changes. He was advised to continue taking Wellbutrin (bupropion) and to take a dose immediately to prevent withdrawal symptoms. He was also advised to set alarms to ensure timely medication intake. If he continues to experience issues, alternative treatment options will be considered.    2. Anxiety.  Buspirone 10 mg twice daily was prescribed to help manage anxiety symptoms. He was advised to set alarms to ensure timely medication intake. If he continues to experience issues, alternative treatment options will be considered.    3. Suspected De Quervain's tenosynovitis.  A nerve conduction study was ordered, and a referral to [facility] was made. He was advised to take over-the-counter  anti-inflammatories such as Tylenol or ibuprofen for 4 to 5 days to alleviate symptoms. If [facility] does not accept his insurance, an alternative provider will be considered.    Follow-up  Return in 2 months for follow up.            Return in about 2 months (around 12/25/2024) for Recheck.    I discussed my findings,recommendations, and plan of care was with the patient. They verbalized understanding and agreement.  Patient was encouraged to keep me informed of any acute changes, lack of improvement, or any new concerning symptoms.     Patient or patient representative verbalized consent for the use of Ambient Listening during the visit with  ALEXANDER Amos for chart documentation. 11/2/2024  17:02 EDT